# Patient Record
Sex: MALE | URBAN - METROPOLITAN AREA
[De-identification: names, ages, dates, MRNs, and addresses within clinical notes are randomized per-mention and may not be internally consistent; named-entity substitution may affect disease eponyms.]

---

## 2021-12-24 ENCOUNTER — HISTORICAL (OUTPATIENT)
Dept: ADMINISTRATIVE | Facility: HOSPITAL | Age: 46
End: 2021-12-24

## 2021-12-24 LAB — SARS-COV-2 RNA RESP QL NAA+PROBE: NEGATIVE

## 2021-12-28 ENCOUNTER — HISTORICAL (OUTPATIENT)
Dept: ADMINISTRATIVE | Facility: HOSPITAL | Age: 46
End: 2021-12-28

## 2021-12-28 LAB — SARS-COV-2 RNA RESP QL NAA+PROBE: POSITIVE

## 2022-01-18 ENCOUNTER — HISTORICAL (OUTPATIENT)
Dept: ADMINISTRATIVE | Facility: HOSPITAL | Age: 47
End: 2022-01-18

## 2022-01-21 ENCOUNTER — HISTORICAL (OUTPATIENT)
Dept: ADMINISTRATIVE | Facility: HOSPITAL | Age: 47
End: 2022-01-21

## 2022-04-10 ENCOUNTER — HISTORICAL (OUTPATIENT)
Dept: ADMINISTRATIVE | Facility: HOSPITAL | Age: 47
End: 2022-04-10

## 2022-04-24 VITALS
SYSTOLIC BLOOD PRESSURE: 133 MMHG | WEIGHT: 260 LBS | DIASTOLIC BLOOD PRESSURE: 81 MMHG | OXYGEN SATURATION: 98 % | BODY MASS INDEX: 30.08 KG/M2 | HEIGHT: 78 IN

## 2022-05-03 NOTE — HISTORICAL [facility] CERNER
This is a historical note converted from Marta. Formatting and pictures may have been removed.  Please reference Marta for original formatting and attached multimedia. Chief Complaint  left shoulder pain no surgery..thinks he may have injured it from work continuing to turn tractor wheel..xray today..  History of Present Illness  47-year-old male presents with left shoulder pain.? He reports that he was working on a tractor September 2021 and using his left arm to steer when he felt a severe pain in his left shoulder that was associated with some sudden weakness in the left shoulder.? Patient is left hand dominant.? This shoulder pain is moderate to severe.? Patient reports increased pain with overhead movement. ?Patient reports night pain.? Patient reports anterolateral shoulder pain that radiates down the arm.? He was seen at the [facility] urgent care following the initial injury.? They recommended MRI of left shoulder to assess for possible traumatic rotator cuff tear.  Review of Systems  All review of systems negative except for those stated in the HPI.  Physical Exam  Vitals & Measurements  T:?36.4? ?C (Oral)? HR:?80(Apical)? BP:?133/81?  HT:?203.60?cm? WT:?117.930?kg? BMI:?28.45?  General: Well-developed, well-nourished.  Neuro: Alert and oriented x 3.  Psych: Normal mood and affect.  Left Shoulder Exam:  No obvious deformity. No medial or lateral scapula winging. Forward flexion to 170 degrees and abduction to 170 degrees. Positive empty can,? positive Whipple, Positive drop arm test, Sanchez, impingement, Positive AC joint tenderness. Negative biceps groove tenderness, Positive Hickey?s, Yergason?s, Speed test. Negative Apprehension and Relocation test. 4/5 strength, normal skin appearance and palpable pulses distally. Sensibility normal.  Assessment/Plan  1.?Left rotator cuff tear?M75.102  X-rays were reviewed with the patient.? His history, physical exam, and X-ray imaging are all indicative of rotator cuff  pathology.? We will obtain an MRI of his left shoulder to assess for suspected rotator cuff pathology.? He will return to clinic for review of MRI results once imaging is obtained.? He and his spouse verbalized understanding of the plan of care with no further questions.  Ordered:  MRI Ext Upper Joint Left W/O Contrast, Routine, 01/18/22 9:11:00 CST, Other (please specify), Shoulder pain, rotator cuff disorder suspected, xray done, None, Ambulatory, Rad Type, Order for future visit, Left rotator cuff tear, Schedule this test, [facility], 01/18/22 9:11:...  ?   Problem List/Past Medical History  Ongoing  No chronic problems  Historical  No qualifying data  Procedure/Surgical History  none   Medications  No active medications  Allergies  No Known Allergies  No Known Medication Allergies  Social History  Abuse/Neglect  No, 01/18/2022  Alcohol  Never, 12/24/2021  Substance Use  Never, 12/24/2021  Tobacco  Never (less than 100 in lifetime), N/A, 01/18/2022  Family History  Colon cancer.: Father.  Health Maintenance  Health Maintenance  ???Pending?(in the next year)  ??? ??OverDue  ??? ? ? ?Influenza Vaccine due??10/01/21??and every 1??day(s)  ??? ??Due?  ??? ? ? ?Alcohol Misuse Screening due??01/02/22??and every 1??year(s)  ??? ? ? ?Aspirin Therapy for CVD Prevention due??01/18/22??and every 1??year(s)  ??? ? ? ?Depression Screening due??01/18/22??Unknown Frequency  ??? ? ? ?Diabetes Screening due??01/18/22??Unknown Frequency  ??? ? ? ?Lipid Screening due??01/18/22??Unknown Frequency  ??? ? ? ?Tetanus Vaccine due??01/18/22??and every 10??year(s)  ??? ??Due In Future?  ??? ? ? ?Obesity Screening not due until??01/01/23??and every 1??year(s)  ???Satisfied?(in the past 1 year)  ??? ??Satisfied?  ??? ? ? ?ADL Screening on??01/18/22.??Satisfied by Sera Keller LPN  ??? ? ? ?Blood Pressure Screening on??01/18/22.??Satisfied by Sera Keller LPN  ??? ? ? ?Body Mass Index Check on??01/18/22.??Satisfied by  Sera Keller LPN  ??? ? ? ?Influenza Vaccine on??01/18/22.??Satisfied by Sera Keller LPN  ??? ? ? ?Obesity Screening on??01/18/22.??Satisfied by Sera Keller LPN  ?  Diagnostic Results  X-rays of left shoulder demonstrate minimal superior migration of the humeral head.      no